# Patient Record
Sex: FEMALE | Race: WHITE | HISPANIC OR LATINO | Employment: OTHER | URBAN - METROPOLITAN AREA
[De-identification: names, ages, dates, MRNs, and addresses within clinical notes are randomized per-mention and may not be internally consistent; named-entity substitution may affect disease eponyms.]

---

## 2021-08-19 PROBLEM — Z01.419 WELL WOMAN EXAM: Status: ACTIVE | Noted: 2021-08-19

## 2021-08-19 PROBLEM — O09.90 HIGH-RISK PREGNANCY: Status: ACTIVE | Noted: 2021-08-19

## 2021-08-19 PROBLEM — O09.529 AMA (ADVANCED MATERNAL AGE) MULTIGRAVIDA 35+: Status: ACTIVE | Noted: 2021-08-19

## 2021-08-20 ENCOUNTER — LAB (OUTPATIENT)
Dept: LAB | Facility: HOSPITAL | Age: 36
End: 2021-08-20

## 2021-08-20 ENCOUNTER — INITIAL PRENATAL (OUTPATIENT)
Dept: OBSTETRICS AND GYNECOLOGY | Facility: CLINIC | Age: 36
End: 2021-08-20

## 2021-08-20 VITALS
BODY MASS INDEX: 26.22 KG/M2 | HEIGHT: 63 IN | SYSTOLIC BLOOD PRESSURE: 116 MMHG | WEIGHT: 148 LBS | DIASTOLIC BLOOD PRESSURE: 76 MMHG

## 2021-08-20 DIAGNOSIS — O09.92 HIGH-RISK PREGNANCY IN SECOND TRIMESTER: Primary | ICD-10-CM

## 2021-08-20 DIAGNOSIS — O09.92 HIGH-RISK PREGNANCY IN SECOND TRIMESTER: ICD-10-CM

## 2021-08-20 DIAGNOSIS — Z71.85 VACCINE COUNSELING: ICD-10-CM

## 2021-08-20 DIAGNOSIS — O09.522 MULTIGRAVIDA OF ADVANCED MATERNAL AGE IN SECOND TRIMESTER: ICD-10-CM

## 2021-08-20 PROBLEM — O99.019 MATERNAL ANEMIA IN PREGNANCY, ANTEPARTUM: Status: ACTIVE | Noted: 2021-08-20

## 2021-08-20 PROBLEM — Z78.9 LANGUAGE BARRIER: Status: ACTIVE | Noted: 2021-08-20

## 2021-08-20 LAB
2ND TRIMESTER 4 SCREEN SERPL-IMP: NORMAL
ABO GROUP BLD: NORMAL
AFP INTERP SERPL-IMP: NORMAL
AMPHET+METHAMPHET UR QL: NEGATIVE
AMPHETAMINES UR QL: NEGATIVE
BARBITURATES UR QL SCN: NEGATIVE
BASOPHILS # BLD AUTO: 0.04 10*3/MM3 (ref 0–0.2)
BASOPHILS NFR BLD AUTO: 0.6 % (ref 0–1.5)
BENZODIAZ UR QL SCN: NEGATIVE
BLD GP AB SCN SERPL QL: NEGATIVE
BUPRENORPHINE SERPL-MCNC: NEGATIVE NG/ML
CANNABINOIDS SERPL QL: NEGATIVE
COCAINE UR QL: NEGATIVE
DEPRECATED RDW RBC AUTO: 39.3 FL (ref 37–54)
EOSINOPHIL # BLD AUTO: 0.14 10*3/MM3 (ref 0–0.4)
EOSINOPHIL NFR BLD AUTO: 2 % (ref 0.3–6.2)
ERYTHROCYTE [DISTWIDTH] IN BLOOD BY AUTOMATED COUNT: 12.3 % (ref 12.3–15.4)
EXTERNAL NIPT: NORMAL
HBV SURFACE AG SERPL QL IA: NORMAL
HCT VFR BLD AUTO: 31.6 % (ref 34–46.6)
HCV AB SER DONR QL: NORMAL
HGB BLD-MCNC: 10.6 G/DL (ref 12–15.9)
HIV1+2 AB SER QL: NORMAL
IMM GRANULOCYTES # BLD AUTO: 0.03 10*3/MM3 (ref 0–0.05)
IMM GRANULOCYTES NFR BLD AUTO: 0.4 % (ref 0–0.5)
LYMPHOCYTES # BLD AUTO: 2.21 10*3/MM3 (ref 0.7–3.1)
LYMPHOCYTES NFR BLD AUTO: 31.1 % (ref 19.6–45.3)
MCH RBC QN AUTO: 30.2 PG (ref 26.6–33)
MCHC RBC AUTO-ENTMCNC: 33.5 G/DL (ref 31.5–35.7)
MCV RBC AUTO: 90 FL (ref 79–97)
METHADONE UR QL SCN: NEGATIVE
MONOCYTES # BLD AUTO: 0.56 10*3/MM3 (ref 0.1–0.9)
MONOCYTES NFR BLD AUTO: 7.9 % (ref 5–12)
NEUTROPHILS NFR BLD AUTO: 4.12 10*3/MM3 (ref 1.7–7)
NEUTROPHILS NFR BLD AUTO: 58 % (ref 42.7–76)
NRBC BLD AUTO-RTO: 0 /100 WBC (ref 0–0.2)
OPIATES UR QL: NEGATIVE
OXYCODONE UR QL SCN: NEGATIVE
PCP UR QL SCN: NEGATIVE
PLATELET # BLD AUTO: 277 10*3/MM3 (ref 140–450)
PMV BLD AUTO: 10.8 FL (ref 6–12)
PROPOXYPH UR QL: NEGATIVE
RBC # BLD AUTO: 3.51 10*6/MM3 (ref 3.77–5.28)
RH BLD: POSITIVE
RPR SER QL: NORMAL
TRICYCLICS UR QL SCN: NEGATIVE
WBC # BLD AUTO: 7.1 10*3/MM3 (ref 3.4–10.8)

## 2021-08-20 PROCEDURE — 86803 HEPATITIS C AB TEST: CPT

## 2021-08-20 PROCEDURE — 80306 DRUG TEST PRSMV INSTRMNT: CPT

## 2021-08-20 PROCEDURE — 80081 OBSTETRIC PANEL INC HIV TSTG: CPT

## 2021-08-20 PROCEDURE — 36415 COLL VENOUS BLD VENIPUNCTURE: CPT

## 2021-08-20 PROCEDURE — 99204 OFFICE O/P NEW MOD 45 MIN: CPT | Performed by: OBSTETRICS & GYNECOLOGY

## 2021-08-20 PROCEDURE — 87086 URINE CULTURE/COLONY COUNT: CPT

## 2021-08-20 RX ORDER — FERROUS SULFATE 325(65) MG
325 TABLET ORAL
Qty: 60 TABLET | Refills: 4 | Status: SHIPPED | OUTPATIENT
Start: 2021-08-20 | End: 2022-01-17

## 2021-08-20 RX ORDER — PRENATAL VIT/IRON FUM/FOLIC AC 27MG-0.8MG
1 TABLET ORAL DAILY
COMMUNITY

## 2021-08-20 NOTE — PROGRESS NOTES
Subjective   Chief Complaint   Patient presents with   • Initial Prenatal Visit       Luzmaria Almeida is a 36 y.o. year old .  Patient's last menstrual period was 2021 (approximate).  She presents to be seen to initiate prenatal care.  She was unaware she was pregnant.  She had an ultrasound done in the [facility] [place] giving her a due date of November 10.  She is having no issues today.    Seen and interviewed with her daughter serving as the     Social History    Tobacco Use      Smoking status: Never Smoker      The following portions of the patient's history were reviewed and updated as appropriate:vital signs, allergies, current medications, past medical history, past social history, past surgical history and problem list.    Objective   Lab Review   No data reviewed    Imaging   No data reviewed    Assessment/Plan   ASSESSMENT  1. 36 y.o. year old  at 28w2d  2. Late onset prenatal care  3. Supervision of high risk pregnancy  4. AMA - Too late for genetic testing    PLAN  1. The problem list for pregnancy was initiated today  2. I discussed with Luzmaria that she may be behind on needed vaccinations for COVID.  She may be able to obtain these vaccinations at her local pharmacy OR speak about obtaining them with her primary care.  If she does obtain her vaccines, I have asked Luzmaria to let us know the date each vaccine was obtained so that her medical record could be updated in our system.  3. Tests ordered today:  Orders Placed This Encounter   Procedures   • Urine Culture - Urine, Urine, Clean Catch     Standing Status:   Future     Standing Expiration Date:   2022     Order Specific Question:   Release to patient     Answer:   Immediate   • Chlamydia trachomatis, Neisseria gonorrhoeae, PCR w/ confirmation - Urine, Urine, Clean Catch     Standing Status:   Future     Standing Expiration Date:   2022     Order Specific Question:   Release to patient     Answer:   Immediate    • US Ob 14 + Weeks Single or First Gestation     Standing Status:   Future     Standing Expiration Date:   8/20/2022     Order Specific Question:   Reason for Exam:     Answer:   Anatomic screening   • Obstetric Panel     Standing Status:   Future     Standing Expiration Date:   8/20/2022     Order Specific Question:   Release to patient     Answer:   Immediate   • HIV-1 / O / 2 Ag / Antibody 4th Generation     Standing Status:   Future     Standing Expiration Date:   8/20/2022     Order Specific Question:   Release to patient     Answer:   Immediate   • Urine Drug Screen - Urine, Clean Catch     Please confirm all positive UDS     Standing Status:   Future     Standing Expiration Date:   8/20/2022     Order Specific Question:   Release to patient     Answer:   Immediate   • Non-invasive Prenatal Testing     This is an external result entered through the Results Console.     Order Specific Question:   Release to patient     Answer:   Immediate   • Maternal Screen 4     This is an external result entered through the Results Console.     Order Specific Question:   [facility] Date of last menstrual period or estimated date of delivery (corresponding to calculation method):     Answer:   11/10/2021     Order Specific Question:   [facility] Gestational age calculation method:     Answer:   CHANNING,EDC     Order Specific Question:   Release to patient     Answer:   Immediate   • Alpha Fetoprotein, Maternal     This is an external result entered through the Results Console.     Order Specific Question:   [facility] Date of last menstrual period or estimated date of delivery (corresponding to calculation method):     Answer:   11/10/2021     Order Specific Question:   [facility] Gestational age calculation method:     Answer:   CHANNING,EDC     Order Specific Question:   Release to patient     Answer:   Immediate     4. Testing for GC / Chlamydia / trichomonas was done today  5. Information reviewed: exercise in pregnancy, nutrition in  pregnancy, weight gain in pregnancy, work and travel restrictions during pregnancy, list of OTC medications acceptable in pregnancy and call coverage groups    Total time spent today with Luzmaria  was 45-59 minutes (level 4).  Off this time, 90% was spent face-to-face time coordinating care, answering her questions and counseling regarding exercise in pregnancy, nutrition in pregnancy, weight gain in pregnancy, work and travel restrictions during pregnancy, list of OTC medications acceptable in pregnancy and call coverage groups.    Follow up: 1 week(s) along with ultrasound       This note was electronically signed.    Paul Gregory MD  August 20, 2021

## 2021-08-21 LAB
BACTERIA SPEC AEROBE CULT: NORMAL
RUBV IGG SERPL IA-ACNC: 11.5 INDEX

## 2021-08-30 ENCOUNTER — LAB (OUTPATIENT)
Dept: LAB | Facility: HOSPITAL | Age: 36
End: 2021-08-30

## 2021-08-30 ENCOUNTER — ROUTINE PRENATAL (OUTPATIENT)
Dept: OBSTETRICS AND GYNECOLOGY | Facility: CLINIC | Age: 36
End: 2021-08-30

## 2021-08-30 VITALS — WEIGHT: 151 LBS | SYSTOLIC BLOOD PRESSURE: 114 MMHG | BODY MASS INDEX: 26.75 KG/M2 | DIASTOLIC BLOOD PRESSURE: 68 MMHG

## 2021-08-30 DIAGNOSIS — O99.810 ABNORMAL O'SULLIVAN GLUCOSE CHALLENGE TEST, ANTEPARTUM: Primary | ICD-10-CM

## 2021-08-30 DIAGNOSIS — O09.92 HIGH-RISK PREGNANCY IN SECOND TRIMESTER: ICD-10-CM

## 2021-08-30 DIAGNOSIS — O44.53 LOW LYING PLACENTA WITH HEMORRHAGE, THIRD TRIMESTER: ICD-10-CM

## 2021-08-30 DIAGNOSIS — O09.522 MULTIGRAVIDA OF ADVANCED MATERNAL AGE IN SECOND TRIMESTER: ICD-10-CM

## 2021-08-30 DIAGNOSIS — O99.019 MATERNAL ANEMIA IN PREGNANCY, ANTEPARTUM: ICD-10-CM

## 2021-08-30 DIAGNOSIS — O09.93 HIGH-RISK PREGNANCY IN THIRD TRIMESTER: Primary | ICD-10-CM

## 2021-08-30 DIAGNOSIS — O09.523 MULTIGRAVIDA OF ADVANCED MATERNAL AGE IN THIRD TRIMESTER: ICD-10-CM

## 2021-08-30 DIAGNOSIS — O09.93 HIGH-RISK PREGNANCY IN THIRD TRIMESTER: ICD-10-CM

## 2021-08-30 LAB — GLUCOSE 1H P 100 G GLC PO SERPL-MCNC: 155 MG/DL (ref 65–139)

## 2021-08-30 PROCEDURE — 99213 OFFICE O/P EST LOW 20 MIN: CPT | Performed by: OBSTETRICS & GYNECOLOGY

## 2021-08-30 PROCEDURE — 87591 N.GONORRHOEAE DNA AMP PROB: CPT

## 2021-08-30 PROCEDURE — 87491 CHLMYD TRACH DNA AMP PROBE: CPT

## 2021-08-30 PROCEDURE — 82962 GLUCOSE BLOOD TEST: CPT

## 2021-08-30 PROCEDURE — 36415 COLL VENOUS BLD VENIPUNCTURE: CPT

## 2021-08-30 PROCEDURE — 82950 GLUCOSE TEST: CPT

## 2021-08-30 NOTE — PROGRESS NOTES
Chief Complaint   Patient presents with   • Routine Prenatal Visit       HPI: Luzmaria is a  currently at 29w5d who today reports the following:  Contractions - No; Leaking - No; Vaginal bleeding -  No; Swelling of extremities - No.    ROS:  GI: Nausea - No; Constipation - No; Diarrhea - No    Neuro: Headache - No; Visual change - No    Respiratory: Cough - No; SOB - No; fever - No     EXAM:  Vitals: See prenatal flowsheet   Abdomen: See prenatal flowsheet   Urine glucose/protein: See prenatal flowsheet   Pelvic: See prenatal flowsheet   MDM:   Impression: 1. Supervision of high risk pregnancy  2. Placenta previa / low-lying placenta  3. AMA - declines testing  4. Anemia in pregnancy   Tests done today: 1. U/S for anatomic screening - anatomy completely seen today   Topics discussed: 1. Continue with PNV's  2. Prenatal labs reviewed  3. Recheck placenta in 1 month   Tests scheduled today for her next visit:   GC/Chlamydia testing  GCT ASAP

## 2021-09-02 LAB
C TRACH RRNA SPEC QL NAA+PROBE: NEGATIVE
N GONORRHOEA RRNA SPEC QL NAA+PROBE: NEGATIVE

## 2021-11-16 ENCOUNTER — HOSPITAL ENCOUNTER (INPATIENT)
Facility: HOSPITAL | Age: 36
LOS: 2 days | Discharge: HOME | End: 2021-11-18
Attending: OBSTETRICS & GYNECOLOGY | Admitting: OBSTETRICS & GYNECOLOGY

## 2021-11-16 PROBLEM — O99.019 MATERNAL ANEMIA IN PREGNANCY, ANTEPARTUM: Status: RESOLVED | Noted: 2021-08-20 | Resolved: 2021-11-16

## 2021-11-16 PROBLEM — O09.529 AMA (ADVANCED MATERNAL AGE) MULTIGRAVIDA 35+: Status: RESOLVED | Noted: 2021-08-19 | Resolved: 2021-11-16

## 2021-11-16 PROBLEM — Z37.9 NORMAL LABOR: Status: ACTIVE | Noted: 2021-11-16

## 2021-11-16 PROBLEM — O09.93 HIGH-RISK PREGNANCY IN THIRD TRIMESTER: Status: RESOLVED | Noted: 2021-08-19 | Resolved: 2021-11-16

## 2021-11-16 PROBLEM — Z78.9 LANGUAGE BARRIER: Status: RESOLVED | Noted: 2021-08-20 | Resolved: 2021-11-16

## 2021-11-16 PROBLEM — O99.810 ABNORMAL O'SULLIVAN GLUCOSE CHALLENGE TEST, ANTEPARTUM: Status: RESOLVED | Noted: 2021-08-30 | Resolved: 2021-11-16

## 2021-11-16 PROBLEM — Z37.9 NORMAL LABOR: Status: RESOLVED | Noted: 2021-11-16 | Resolved: 2021-11-16

## 2021-11-16 PROBLEM — O44.53 LOW LYING PLACENTA WITH HEMORRHAGE, THIRD TRIMESTER: Status: RESOLVED | Noted: 2021-08-30 | Resolved: 2021-11-16

## 2021-11-16 LAB
ABO GROUP BLD: NORMAL
ALP SERPL-CCNC: 207 U/L (ref 39–117)
ALT SERPL W P-5'-P-CCNC: 7 U/L (ref 1–33)
AST SERPL-CCNC: 14 U/L (ref 1–32)
BILIRUB SERPL-MCNC: 0.8 MG/DL (ref 0–1.2)
BLD GP AB SCN SERPL QL: NEGATIVE
CREAT SERPL-MCNC: 0.51 MG/DL (ref 0.57–1)
DEPRECATED RDW RBC AUTO: 45.2 FL (ref 37–54)
ERYTHROCYTE [DISTWIDTH] IN BLOOD BY AUTOMATED COUNT: 14.2 % (ref 12.3–15.4)
GLUCOSE SERPL-MCNC: 86 MG/DL (ref 65–99)
HCT VFR BLD AUTO: 33.4 % (ref 34–46.6)
HGB BLD-MCNC: 10.5 G/DL (ref 12–15.9)
LDH SERPL-CCNC: 177 U/L (ref 135–214)
MCH RBC QN AUTO: 27.7 PG (ref 26.6–33)
MCHC RBC AUTO-ENTMCNC: 31.4 G/DL (ref 31.5–35.7)
MCV RBC AUTO: 88.1 FL (ref 79–97)
PLATELET # BLD AUTO: 256 10*3/MM3 (ref 140–450)
PMV BLD AUTO: 10.8 FL (ref 6–12)
RBC # BLD AUTO: 3.79 10*6/MM3 (ref 3.77–5.28)
RH BLD: POSITIVE
SARS-COV-2 RDRP RESP QL NAA+PROBE: NORMAL
T&S EXPIRATION DATE: NORMAL
URATE SERPL-MCNC: 3.6 MG/DL (ref 2.4–5.7)
WBC # BLD AUTO: 9.04 10*3/MM3 (ref 3.4–10.8)

## 2021-11-16 PROCEDURE — 85027 COMPLETE CBC AUTOMATED: CPT | Performed by: OBSTETRICS & GYNECOLOGY

## 2021-11-16 PROCEDURE — 84460 ALANINE AMINO (ALT) (SGPT): CPT | Performed by: OBSTETRICS & GYNECOLOGY

## 2021-11-16 PROCEDURE — 36415 COLL VENOUS BLD VENIPUNCTURE: CPT | Performed by: OBSTETRICS & GYNECOLOGY

## 2021-11-16 PROCEDURE — 82947 ASSAY GLUCOSE BLOOD QUANT: CPT | Performed by: OBSTETRICS & GYNECOLOGY

## 2021-11-16 PROCEDURE — 87635 SARS-COV-2 COVID-19 AMP PRB: CPT | Performed by: OBSTETRICS & GYNECOLOGY

## 2021-11-16 PROCEDURE — 59410 OBSTETRICAL CARE: CPT | Performed by: OBSTETRICS & GYNECOLOGY

## 2021-11-16 PROCEDURE — 84550 ASSAY OF BLOOD/URIC ACID: CPT | Performed by: OBSTETRICS & GYNECOLOGY

## 2021-11-16 PROCEDURE — 84075 ASSAY ALKALINE PHOSPHATASE: CPT | Performed by: OBSTETRICS & GYNECOLOGY

## 2021-11-16 PROCEDURE — 86900 BLOOD TYPING SEROLOGIC ABO: CPT | Performed by: OBSTETRICS & GYNECOLOGY

## 2021-11-16 PROCEDURE — 59025 FETAL NON-STRESS TEST: CPT

## 2021-11-16 PROCEDURE — 86901 BLOOD TYPING SEROLOGIC RH(D): CPT | Performed by: OBSTETRICS & GYNECOLOGY

## 2021-11-16 PROCEDURE — 25010000002 METHYLERGONOVINE MALEATE PER 0.2 MG: Performed by: OBSTETRICS & GYNECOLOGY

## 2021-11-16 PROCEDURE — S0260 H&P FOR SURGERY: HCPCS | Performed by: OBSTETRICS & GYNECOLOGY

## 2021-11-16 PROCEDURE — 86850 RBC ANTIBODY SCREEN: CPT | Performed by: OBSTETRICS & GYNECOLOGY

## 2021-11-16 PROCEDURE — 82565 ASSAY OF CREATININE: CPT | Performed by: OBSTETRICS & GYNECOLOGY

## 2021-11-16 PROCEDURE — 10907ZC DRAINAGE OF AMNIOTIC FLUID, THERAPEUTIC FROM PRODUCTS OF CONCEPTION, VIA NATURAL OR ARTIFICIAL OPENING: ICD-10-PCS | Performed by: OBSTETRICS & GYNECOLOGY

## 2021-11-16 PROCEDURE — 82247 BILIRUBIN TOTAL: CPT | Performed by: OBSTETRICS & GYNECOLOGY

## 2021-11-16 PROCEDURE — 84450 TRANSFERASE (AST) (SGOT): CPT | Performed by: OBSTETRICS & GYNECOLOGY

## 2021-11-16 PROCEDURE — 83615 LACTATE (LD) (LDH) ENZYME: CPT | Performed by: OBSTETRICS & GYNECOLOGY

## 2021-11-16 RX ORDER — METHYLERGONOVINE MALEATE 0.2 MG/ML
INJECTION INTRAVENOUS
Status: DISCONTINUED
Start: 2021-11-16 | End: 2021-11-18 | Stop reason: HOSPADM

## 2021-11-16 RX ORDER — DOCUSATE SODIUM 100 MG/1
100 CAPSULE, LIQUID FILLED ORAL 2 TIMES DAILY
Status: DISCONTINUED | OUTPATIENT
Start: 2021-11-16 | End: 2021-11-18 | Stop reason: HOSPADM

## 2021-11-16 RX ORDER — BISACODYL 10 MG
10 SUPPOSITORY, RECTAL RECTAL DAILY PRN
Status: DISCONTINUED | OUTPATIENT
Start: 2021-11-17 | End: 2021-11-18 | Stop reason: HOSPADM

## 2021-11-16 RX ORDER — OXYTOCIN-SODIUM CHLORIDE 0.9% IV SOLN 30 UNIT/500ML 30-0.9/5 UT/ML-%
650 SOLUTION INTRAVENOUS ONCE
Status: COMPLETED | OUTPATIENT
Start: 2021-11-16 | End: 2021-11-16

## 2021-11-16 RX ORDER — METHYLERGONOVINE MALEATE 0.2 MG/ML
200 INJECTION INTRAVENOUS ONCE AS NEEDED
Status: COMPLETED | OUTPATIENT
Start: 2021-11-16 | End: 2021-11-16

## 2021-11-16 RX ORDER — IBUPROFEN 600 MG/1
600 TABLET ORAL EVERY 6 HOURS PRN
Status: DISCONTINUED | OUTPATIENT
Start: 2021-11-16 | End: 2021-11-18 | Stop reason: HOSPADM

## 2021-11-16 RX ORDER — LANOLIN
CREAM (ML) TOPICAL
Status: DISCONTINUED | OUTPATIENT
Start: 2021-11-16 | End: 2021-11-18 | Stop reason: HOSPADM

## 2021-11-16 RX ORDER — MISOPROSTOL 200 UG/1
800 TABLET ORAL AS NEEDED
Status: DISCONTINUED | OUTPATIENT
Start: 2021-11-16 | End: 2021-11-16 | Stop reason: HOSPADM

## 2021-11-16 RX ORDER — LIDOCAINE HYDROCHLORIDE 10 MG/ML
5 INJECTION, SOLUTION EPIDURAL; INFILTRATION; INTRACAUDAL; PERINEURAL AS NEEDED
Status: DISCONTINUED | OUTPATIENT
Start: 2021-11-16 | End: 2021-11-16 | Stop reason: HOSPADM

## 2021-11-16 RX ORDER — HYDROCORTISONE 25 MG/G
1 CREAM TOPICAL AS NEEDED
Status: DISCONTINUED | OUTPATIENT
Start: 2021-11-16 | End: 2021-11-18 | Stop reason: HOSPADM

## 2021-11-16 RX ORDER — MAGNESIUM CARB/ALUMINUM HYDROX 105-160MG
30 TABLET,CHEWABLE ORAL ONCE
Status: DISCONTINUED | OUTPATIENT
Start: 2021-11-16 | End: 2021-11-16 | Stop reason: HOSPADM

## 2021-11-16 RX ORDER — SODIUM CHLORIDE, SODIUM LACTATE, POTASSIUM CHLORIDE, CALCIUM CHLORIDE 600; 310; 30; 20 MG/100ML; MG/100ML; MG/100ML; MG/100ML
125 INJECTION, SOLUTION INTRAVENOUS CONTINUOUS
Status: DISCONTINUED | OUTPATIENT
Start: 2021-11-16 | End: 2021-11-17

## 2021-11-16 RX ORDER — OXYTOCIN-SODIUM CHLORIDE 0.9% IV SOLN 30 UNIT/500ML 30-0.9/5 UT/ML-%
85 SOLUTION INTRAVENOUS ONCE
Status: COMPLETED | OUTPATIENT
Start: 2021-11-16 | End: 2021-11-16

## 2021-11-16 RX ORDER — CARBOPROST TROMETHAMINE 250 UG/ML
250 INJECTION, SOLUTION INTRAMUSCULAR AS NEEDED
Status: DISCONTINUED | OUTPATIENT
Start: 2021-11-16 | End: 2021-11-16 | Stop reason: HOSPADM

## 2021-11-16 RX ORDER — ACETAMINOPHEN 325 MG/1
650 TABLET ORAL EVERY 4 HOURS PRN
Status: DISCONTINUED | OUTPATIENT
Start: 2021-11-16 | End: 2021-11-16 | Stop reason: HOSPADM

## 2021-11-16 RX ORDER — ONDANSETRON 2 MG/ML
4 INJECTION INTRAMUSCULAR; INTRAVENOUS EVERY 6 HOURS PRN
Status: DISCONTINUED | OUTPATIENT
Start: 2021-11-16 | End: 2021-11-16 | Stop reason: HOSPADM

## 2021-11-16 RX ORDER — SODIUM CHLORIDE 0.9 % (FLUSH) 0.9 %
3 SYRINGE (ML) INJECTION EVERY 12 HOURS SCHEDULED
Status: DISCONTINUED | OUTPATIENT
Start: 2021-11-16 | End: 2021-11-16 | Stop reason: HOSPADM

## 2021-11-16 RX ORDER — ONDANSETRON 4 MG/1
4 TABLET, FILM COATED ORAL EVERY 6 HOURS PRN
Status: DISCONTINUED | OUTPATIENT
Start: 2021-11-16 | End: 2021-11-16 | Stop reason: HOSPADM

## 2021-11-16 RX ORDER — BUTORPHANOL TARTRATE 1 MG/ML
1 INJECTION, SOLUTION INTRAMUSCULAR; INTRAVENOUS
Status: DISCONTINUED | OUTPATIENT
Start: 2021-11-16 | End: 2021-11-16 | Stop reason: HOSPADM

## 2021-11-16 RX ORDER — OXYTOCIN-SODIUM CHLORIDE 0.9% IV SOLN 30 UNIT/500ML 30-0.9/5 UT/ML-%
SOLUTION INTRAVENOUS
Status: DISCONTINUED
Start: 2021-11-16 | End: 2021-11-18 | Stop reason: HOSPADM

## 2021-11-16 RX ORDER — SODIUM CHLORIDE 0.9 % (FLUSH) 0.9 %
10 SYRINGE (ML) INJECTION AS NEEDED
Status: DISCONTINUED | OUTPATIENT
Start: 2021-11-16 | End: 2021-11-16 | Stop reason: HOSPADM

## 2021-11-16 RX ORDER — ERYTHROMYCIN 5 MG/G
OINTMENT OPHTHALMIC
Status: DISCONTINUED
Start: 2021-11-16 | End: 2021-11-18 | Stop reason: HOSPADM

## 2021-11-16 RX ADMIN — OXYTOCIN 650 ML/HR: 10 INJECTION INTRAVENOUS at 10:22

## 2021-11-16 RX ADMIN — IBUPROFEN 600 MG: 600 TABLET, FILM COATED ORAL at 16:50

## 2021-11-16 RX ADMIN — DOCUSATE SODIUM 100 MG: 100 CAPSULE, LIQUID FILLED ORAL at 20:12

## 2021-11-16 RX ADMIN — Medication: at 16:50

## 2021-11-16 RX ADMIN — METHYLERGONOVINE MALEATE 200 MCG: 0.2 INJECTION, SOLUTION INTRAMUSCULAR; INTRAVENOUS at 10:57

## 2021-11-16 RX ADMIN — SODIUM CHLORIDE, POTASSIUM CHLORIDE, SODIUM LACTATE AND CALCIUM CHLORIDE 125 ML/HR: 600; 310; 30; 20 INJECTION, SOLUTION INTRAVENOUS at 07:23

## 2021-11-16 RX ADMIN — OXYTOCIN 85 ML/HR: 10 INJECTION INTRAVENOUS at 11:36

## 2021-11-16 NOTE — H&P
Luzmaria Almeida  1985  [id number]  [id number]    CC: contractions  HPI:  Patient is 36 y.o. female   currently at 40w6d presents with c/o uterine contractions, onset ~0200, intermittent, 8/10 (Adriel/Wayne), prob SROM ~0600, denies veg bleeding.  Good FM.  PNC comp by limited PNC and low lying placenta at 28 weeks.    PMH:  Current meds: PNV  Illnesses: none  Surgeries: none  Allergies: NKDA    Past OB History:       OB History    Para Term  AB Living   3 2 2 0 0 2   SAB IAB Ectopic Molar Multiple Live Births   0 0 0 0 0 2      # Outcome Date GA Lbr Yuan/2nd Weight Sex Delivery Anes PTL Lv   3 Current            2 Term 13    M Vag-Spont   AMRITA   1 Term 06    F Vag-Spont   AMRITA      Obstetric Comments   2006 - [facility] (FOB #1)   2013 - [facility] (FOB #1)   Current  (FOB #1)            SH: tob neg , EtOH neg, drugs neg      General ROS: contractions.   All other systems reviewed and are negative.      Physical Examination: General appearance - alert, well appearing, and in no distress  Vital signs - /79   Pulse 81   Temp 98 °F (36.7 °C)   Resp 16   LMP 2021 (Approximate)   HEENT: normocephalic, atraumatic,oropharynx clear, appearance of ears and nose normal  Neck - supple, no significant adenopathy, no thyromegaly  Lymphatics - no palpable lymphadenopathy in the neck or groin, no hepatosplenomegaly  Chest - clear to auscultation, no wheezes, rales or rhonchi, respiratory effort non-labored  Heart - normal rate, regular rhythm, no murmurs, rubs, clicks or gallops, no JVD, no lower extremity edema  Abdomen - soft, nontender, nondistended, no masses, no hepatosplenomegaly  no rebound tenderness noted, bowel sounds normal  Vaginal Exam: 7/90%/-1, no blood in vault ,external genitalia normal  Extremities - no pedal edema noted, no calf tend  Skin -warm and dry, normal coloration and turgor, no rashes, no suspicious skin lesions noted    Fetal monitoring: indication contractions ,  "onset 0628 , offset 0710 , baseline 130-135 , mod BTB variability , multiple accels (15 X 15), no decels except occas early-type, q3-4\" contractions, interpretation reactive NST    Radiology US: post-lat placenta, no previa    Assessment 1)IUP 40 6/7 weeks   2)normal labor   3)limited PNC    Plan 1)admit    John Akhtar MD  11/16/2021  07:07 EST                                                                    "

## 2021-11-16 NOTE — L&D DELIVERY NOTE
[facility]  Vaginal Delivery Note    Delivery details     Delivery: Vaginal, Spontaneous     YOB: 2023    Time of Birth: 10:17 AM      Anesthesia: None     Delivering clinician: Paul Gregory    Forceps?   No   Vacuum? No    Shoulder dystocia present: No      Delivery narrative: Amniotomy performed with clear fluid.  Subsequent to that began with spontaneous pushing.  Spontaneous delivery from occiput anterior over intact perineum.  Mouth and nose bulb suction on perineum and again after delivery.  Right shoulder anterior.  Shoulder dystocia not present.  After 30 seconds of delay, cord clamped and infant placed on maternal abdomen.  Cord blood and cord segment obtained.  Placenta then delivered spontaneously intact with normal three-vessel cord.  Brisk bleeding corrected with uterine massage.  No lacerations present.    Infant details    Findings: female  infant     Infant observations: Weight: 4015 g (8 lb 13.6 oz)   Length: 20.5  in   Apgars:   @ 1 minute /      @ 5 minutes     Placenta, Cord, and Fluid    Placenta delivered  Spontaneous  at   11/16/2021 10:22 AM     Cord: 3 vessels  present.   Nuchal Cord?  no   Cord blood obtained: Yes      Repair    Episiotomy: None    Estimated Blood Loss: Est. Blood Loss (mL): 350 mL (Filed from Delivery Summary) (11/16/21 1017)       Complications  none    Disposition  Mother to Mother Baby/Postpartum  in stable condition currently.  Baby to NBN  in stable condition currently.      Paul Gregory MD  11/16/21

## 2021-11-16 NOTE — LACTATION NOTE
11/16/21 1400   Maternal Information   Date of Referral 11/16/21   Person Making Referral other (see comments)  (courtesy)   Maternal Reason for Referral other (see comments)  (Mom reports baby latched and nursed well after delivery.  Nursed first child successfully.)   Maternal Infant Feeding   Maternal Emotional State relaxed   Milk Expression/Equipment   Breast Pump Type double electric, personal

## 2021-11-17 LAB
HCT VFR BLD AUTO: 24.5 % (ref 34–46.6)
HGB BLD-MCNC: 7.9 G/DL (ref 12–15.9)

## 2021-11-17 PROCEDURE — 85018 HEMOGLOBIN: CPT | Performed by: OBSTETRICS & GYNECOLOGY

## 2021-11-17 PROCEDURE — 85014 HEMATOCRIT: CPT | Performed by: OBSTETRICS & GYNECOLOGY

## 2021-11-17 RX ADMIN — IBUPROFEN 600 MG: 600 TABLET, FILM COATED ORAL at 03:12

## 2021-11-17 RX ADMIN — IBUPROFEN 600 MG: 600 TABLET, FILM COATED ORAL at 10:19

## 2021-11-17 RX ADMIN — DOCUSATE SODIUM 100 MG: 100 CAPSULE, LIQUID FILLED ORAL at 10:04

## 2021-11-18 VITALS
HEART RATE: 67 BPM | SYSTOLIC BLOOD PRESSURE: 91 MMHG | DIASTOLIC BLOOD PRESSURE: 58 MMHG | RESPIRATION RATE: 16 BRPM | TEMPERATURE: 98.5 F

## 2021-11-18 PROCEDURE — 25010000002 MEASLES, MUMPS & RUBELLA VAC RECONSTITUTED SOLUTION: Performed by: OBSTETRICS & GYNECOLOGY

## 2021-11-18 PROCEDURE — 90471 IMMUNIZATION ADMIN: CPT | Performed by: OBSTETRICS & GYNECOLOGY

## 2021-11-18 PROCEDURE — 90707 MMR VACCINE SC: CPT | Performed by: OBSTETRICS & GYNECOLOGY

## 2021-11-18 RX ADMIN — MEASLES, MUMPS, AND RUBELLA VIRUS VACCINE LIVE 0.5 ML: 1000; 12500; 1000 INJECTION, POWDER, LYOPHILIZED, FOR SUSPENSION SUBCUTANEOUS at 13:25

## 2021-11-18 NOTE — DISCHARGE SUMMARY
Discharge Summary     Nia Almeida  : 1985  MRN: [id number]  CSN: [id number]    Date of Admission: 2021   Date of Discharge:  2021   Delivering Physician: Paul Gregory        Admission Diagnosis: 1. Normal labor [O80, Z37.9]   2. Insufficient prenatal care   Discharge Diagnosis: 1. Same as above plus  2. Pregnancy at 40w6d - delivered  3. Postpartum anemia       Procedures: 2021  - Vaginal, Spontaneous       Hospital Course  Patient is a 36 y.o.  who at 40w6d had a vaginal birth.  Her postpartum course was without complications.  On PPD # 2 she was ready for discharge.  She had normal lochia and pain well controlled with oral medications.    Infant  female  fetus weighing 4015 g (8 lb 13.6 oz)   Apgars -  9 @ 1 minute /  9 @ 5 minutes.    Discharge labs  Lab Results   Component Value Date    WBC 9.04 2021    HGB 7.9 (L) 2021    HCT 24.5 (L) 2021     2021       Discharge Medications     Discharge Medications      Continue These Medications      Instructions Start Date   ferrous sulfate 325 (65 FE) MG tablet   325 mg, Oral, 2 Times Daily Before Meals      prenatal vitamin 27-0.8 27-0.8 MG tablet tablet   1 tablet, Oral, Daily             Discharge Disposition Home or Self Care   Condition on Discharge: good   Follow-up: 6 weeks with Dr. Bri Gregory MD  2021

## 2021-11-18 NOTE — PROGRESS NOTES
Nia Almeida  : 1985  MRN: [id number]  CSN: [id number]    Hospital Day: 3    Postpartum Day #2  Subjective     CC: hospital follow-up    Her pain is well controlled.  Vaginal bleeding is less than a normal period.      Objective     Min/max vitals past 24 hours:   Temp  Min: 97.7 °F (36.5 °C)  Max: 98 °F (36.7 °C)  BP  Min: 96/54  Max: 98/54  Pulse  Min: 73  Max: 81  Resp  Min: 16  Max: 18        General: well developed; well nourished  no acute distress   Abdomen: fundus firm and non-tender   Pelvic: Not performed   Ext: Calves NT     Results from last 7 days   Lab Units 21  0820 21  0710   WBC 10*3/mm3  --  9.04   HEMOGLOBIN g/dL 7.9* 10.5*   HEMATOCRIT % 24.5* 33.4*   PLATELETS 10*3/mm3  --  256     Lab Results   Component Value Date    RH Positive 2021    HEPBSAG Non-Reactive 2021        Assessment   1. Postpartum Day #2 S/P vaginal delivery  2. Postpartum anemia     Plan   1. Discharge to home  2. Advised no tampons or intercourse for 6 weeks.  3. D/C questions all answered  4. Fe x 3 months  5. Follow-up appointment in 6 week(s)    Paul Gregory MD  2021  07:13 EST

## 2021-12-19 NOTE — PROGRESS NOTES
Subjective   Chief Complaint   Patient presents with   • Postpartum Care     Luzmaria Almeida is a 36 y.o. year old  presenting to be seen for her postpartum visit.  She had a vaginal delivery.  Her daughter is doing well.    She does not have concerns about post-partum blues/depression.   Carriere Score = 4  She is breast feeding and plans to continues for 6 month(s).  For ongoing contraception, her plans are IUD - Mirena and Nexplanon.    The following portions of the patient's history were reviewed and updated as appropriate:current medications and allergies    Social History    Tobacco Use      Smoking status: Never Smoker      Smokeless tobacco: Not on file      Review of Systems  Constitutional POS: nothing reported    NEG: anorexia or night sweats   Genitourinary POS: nothing reported    NEG: dysuria or hematuria      Gastointestinal POS: nothing reported    NEG: bloating, change in bowel habits, melena or reflux symptoms   Breast POS: nothing reported    NEG: persistent breast lump, skin dimpling or nipple discharge        Objective   /62   Resp 14   Wt 63.5 kg (140 lb)   LMP 2021   Breastfeeding Yes   BMI 24.80 kg/m²     General:  well developed; well nourished  no acute distress   Abdomen: soft, non-tender; no masses  no umbilical or inguinal hernias are present  no hepato-splenomegaly   Pelvis: Clinical staff was present for exam  External genitalia:  normal appearance of the external genitalia including [place]th[place]'s and [place]'s glands.  :  urethral meatus normal;  Vaginal:  normal pink mucosa without prolapse or lesions.  Cervix:  normal appearance.  Uterus:  normal size, shape and consistency.  Adnexa:  normal bimanual exam of the adnexa.  Rectal:  digital rectal exam not performed; anus visually normal appearing.          Assessment   1. Normal 6 week postpartum exam S/P vaginal delivery     Plan   1. Pap was done today.  If she does not receive the results of the Pap within 2 weeks   time, she was instructed to call to find out the results.  I explained to Luzmaria that the recommendations for Pap smear interval in a low risk patient's has lengthened to 3 years time.  I encouraged her to be seen yearly for a full physical exam including breast and pelvic exam even during the off years when PAP's will not be performed.  2. Her vaccine record was reviewed and updated.  3. I discussed with Luzmaria that she may be behind on needed vaccinations for Influenza and COVID.  She may be able to obtain these vaccinations at her local pharmacy OR speak about obtaining them with her primary care.  If she does obtain her vaccines, I have asked Luzmaria to let us know the date each vaccine was obtained so that her medical record could be updated in our system.  4. BC options reviewed and compared today: IUD - Mirena and Nexplanon  5. The importance of keeping all planned follow-up and taking all medications as prescribed was emphasized.  6. Follow up for annual exam 1 year or for Mirena         This note was electronically signed.    Paul Gregory M.D.  December 30, 2021    Part of this note may be an electronic transcription/translation of spoken language to printed text using the Dragon Dictation System.

## 2021-12-30 ENCOUNTER — POSTPARTUM VISIT (OUTPATIENT)
Dept: OBSTETRICS AND GYNECOLOGY | Facility: CLINIC | Age: 36
End: 2021-12-30

## 2021-12-30 VITALS
BODY MASS INDEX: 24.8 KG/M2 | SYSTOLIC BLOOD PRESSURE: 104 MMHG | RESPIRATION RATE: 14 BRPM | DIASTOLIC BLOOD PRESSURE: 62 MMHG | WEIGHT: 140 LBS

## 2021-12-30 DIAGNOSIS — Z12.4 SCREENING FOR CERVICAL CANCER: ICD-10-CM

## 2021-12-30 DIAGNOSIS — Z71.85 VACCINE COUNSELING: ICD-10-CM

## 2021-12-30 PROCEDURE — 0503F POSTPARTUM CARE VISIT: CPT | Performed by: OBSTETRICS & GYNECOLOGY

## 2022-01-09 PROBLEM — Z97.5 IUD (INTRAUTERINE DEVICE) IN PLACE: Status: ACTIVE | Noted: 2022-01-09

## 2022-10-08 NOTE — PROGRESS NOTES
Nia Almeida  : 1985  MRN: [id number]  CSN: [id number]    Hospital Day: 2    Postpartum Day #1  Subjective     CC: hospital follow-up    Her pain is well controlled.  Vaginal bleeding is less than a normal period.       Objective     Min/max vitals past 24 hours:   Temp  Min: 97.8 °F (36.6 °C)  Max: 98.4 °F (36.9 °C)  BP  Min: 93/53  Max: 133/67  Pulse  Min: 69  Max: 95  Resp  Min: 14  Max: 16        Abdomen: soft, non-tender; bowel sounds normal; no masses   fundus firm and non-tender   Pelvic: deferred     Results from last 7 days   Lab Units 21  0710   WBC 10*3/mm3 9.04   HEMOGLOBIN g/dL 10.5*   HEMATOCRIT % 33.4*   PLATELETS 10*3/mm3 256     Lab Results   Component Value Date    RH Positive 2021    HEPBSAG Non-Reactive 2021        Assessment   1. Postpartum Day #1 S/P vaginal delivery  2. Doing well     Plan   1. Continue routine postpartum care    Paul Gregory MD  2021  06:32 EST          No